# Patient Record
Sex: MALE | Race: WHITE
[De-identification: names, ages, dates, MRNs, and addresses within clinical notes are randomized per-mention and may not be internally consistent; named-entity substitution may affect disease eponyms.]

---

## 2020-09-01 NOTE — HP
HISTORY OF PRESENT ILLNESS:  Mr. Patton is a 40-year-old man, who is known to us for

prior lumbar decompression back in the summer time of this year, who returns now

with bilateral carpal tunnel syndrome and hopes to have this corrected.  This has

actually been diagnosed on [facility] by Dr. Boudreaux __________ for some time.  His

preference would actually be to take care of both hands at the same time if at all

possible. 



PAST MEDICAL HISTORY:  Significant for chronic pain syndrome.



PAST SURGICAL HISTORY:  Lumbar decompression.



CURRENT MEDICATIONS:  Gabapentin.



ALLERGIES:  NO KNOWN DRUG ALLERGIES.



PHYSICAL EXAMINATION:

The patient is alert and oriented x3.  Gait is normal.  No ataxia.  He does have a

positive Tinel's bilaterally to the right and left wrist. 



ASSESSMENT:  Bilateral carpal tunnel syndrome.



PLAN:  Dr. Horta met with the patient, reviewed his electrophysiologic studies and

advocated for bilateral carpal tunnel release.  He explained the patient risks,

benefits, and alternatives to the procedure.  The patient expressed understanding

and would like to move forward with surgery as discussed.  I do believe the patient

is mentally competent and capable of making medical decisions for himself.  We will

move forward with surgery as planned. 







Job ID:  [id number]

## 2020-09-02 ENCOUNTER — HOSPITAL ENCOUNTER (OUTPATIENT)
Dept: HOSPITAL 92 - SDC | Age: 40
Discharge: HOME | End: 2020-09-02
Attending: NEUROLOGICAL SURGERY
Payer: COMMERCIAL

## 2020-09-02 VITALS — BODY MASS INDEX: 26.5 KG/M2

## 2020-09-02 DIAGNOSIS — G89.4: ICD-10-CM

## 2020-09-02 DIAGNOSIS — G56.03: Primary | ICD-10-CM

## 2020-09-02 PROCEDURE — 01N50ZZ RELEASE MEDIAN NERVE, OPEN APPROACH: ICD-10-PCS | Performed by: NEUROLOGICAL SURGERY

## 2020-09-03 NOTE — OP
DATE OF PROCEDURE:  09/02/2020



FIRST ASSISTANT:  Alton Redmond PA-C



INDICATION:  Pain.



DIAGNOSIS:  Bilateral carpal tunnel syndrome.



PROCEDURES PERFORMED:  

1. Right carpal tunnel release.

2. Left carpal tunnel release.



ANESTHESIA:  General.



DESCRIPTION OF PROCEDURE:  The patient was brought into the operating room and

placed under general anesthesia.  He was placed on table in supine position with

both arms extended perpendicular from his body.  Both hands and wrists were prepped

up to the level of the axilla.  They were prepped and draped.  We started on the

patient's right hand first, where a linear incision was planned along the crease of

the wrist in line with the long axis of the 4th digit.  This area was infiltrated

locally in the subcutaneous space.  An incision was then performed and a

self-retaining retractor placed.  The carpal tunnel ligament was identified and

incised.  The incision was extended in proximal and distal directions until the

carpal tunnel elements were decompressed.  The wound was copiously irrigated.

Hemostasis was maintained throughout.  The wound was closed in a single-layer

technique.  The procedure then was redirected to the left side, where a linear

incision was planned across the crease in the wrist in line with the long axis of

the 4th digit.  Again, an incision was performed on the left side and a

self-retaining retractor was placed.  The carpal tunnel ligament was identified and

incised.  The incision was extended in proximal and distal directions until the

elements of the carpal tunnel were decompressed.  That wound was also irrigated.

Hemostasis was maintained.  The incision was closed in a single-layer technique.

Both incisions were closed and the procedures came to an end without any known

complications. 





Job ID:  [id number]

## 2022-09-08 ENCOUNTER — APPOINTMENT (RX ONLY)
Dept: URBAN - NONMETROPOLITAN AREA CLINIC 7 | Facility: CLINIC | Age: 42
Setting detail: DERMATOLOGY
End: 2022-09-08

## 2022-09-08 DIAGNOSIS — L23.89 ALLERGIC CONTACT DERMATITIS DUE TO OTHER AGENTS: ICD-10-CM

## 2022-09-08 DIAGNOSIS — L81.4 OTHER MELANIN HYPERPIGMENTATION: ICD-10-CM

## 2022-09-08 DIAGNOSIS — Z71.89 OTHER SPECIFIED COUNSELING: ICD-10-CM

## 2022-09-08 PROCEDURE — ? COUNSELING

## 2022-09-08 PROCEDURE — ? DIAGNOSIS COMMENT

## 2022-09-08 PROCEDURE — 99203 OFFICE O/P NEW LOW 30 MIN: CPT

## 2022-09-08 PROCEDURE — ? RECOMMENDATIONS

## 2022-09-08 PROCEDURE — ? EDUCATIONAL RESOURCES PROVIDED

## 2022-09-08 PROCEDURE — ? SUNSCREEN RECOMMENDATIONS

## 2022-09-08 PROCEDURE — ? OBSERVATION

## 2022-09-08 PROCEDURE — ? TREATMENT REGIMEN

## 2022-09-08 ASSESSMENT — LOCATION ZONE DERM
LOCATION ZONE: TRUNK
LOCATION ZONE: FACE

## 2022-09-08 ASSESSMENT — LOCATION DETAILED DESCRIPTION DERM
LOCATION DETAILED: LEFT MEDIAL UPPER BACK
LOCATION DETAILED: LEFT CENTRAL MALAR CHEEK

## 2022-09-08 ASSESSMENT — LOCATION SIMPLE DESCRIPTION DERM
LOCATION SIMPLE: LEFT CHEEK
LOCATION SIMPLE: LEFT UPPER BACK

## 2022-09-08 NOTE — PROCEDURE: MIPS QUALITY
Detail Level: Detailed
Quality 110: Preventive Care And Screening: Influenza Immunization: Influenza Immunization not Administered because Patient Refused.
Quality 431: Preventive Care And Screening: Unhealthy Alcohol Use - Screening: Patient not identified as an unhealthy alcohol user when screened for unhealthy alcohol use using a systematic screening method
Additional Notes: Declined covid shots.

## 2022-09-08 NOTE — PROCEDURE: RECOMMENDATIONS
Recommendation Preamble: The following recommendations were made during the visit:
Detail Level: Zone
Render Risk Assessment In Note?: no
Recommendations (Free Text): Neutragena sensitive skin sun screen  w/ Titanium Dioxide +/- zinc oxide